# Patient Record
Sex: MALE | Race: WHITE | Employment: FULL TIME | URBAN - METROPOLITAN AREA
[De-identification: names, ages, dates, MRNs, and addresses within clinical notes are randomized per-mention and may not be internally consistent; named-entity substitution may affect disease eponyms.]

---

## 2018-07-05 ENCOUNTER — HOSPITAL ENCOUNTER (OUTPATIENT)
Age: 33
Discharge: HOME | End: 2018-07-05
Attending: FAMILY MEDICINE
Payer: COMMERCIAL

## 2018-07-05 ENCOUNTER — APPOINTMENT (OUTPATIENT)
Dept: GENERAL RADIOLOGY | Age: 33
End: 2018-07-05
Attending: FAMILY MEDICINE
Payer: COMMERCIAL

## 2018-07-05 VITALS
RESPIRATION RATE: 20 BRPM | HEART RATE: 92 BPM | HEIGHT: 66 IN | WEIGHT: 270 LBS | DIASTOLIC BLOOD PRESSURE: 83 MMHG | SYSTOLIC BLOOD PRESSURE: 130 MMHG | BODY MASS INDEX: 43.39 KG/M2 | OXYGEN SATURATION: 99 % | TEMPERATURE: 98 F

## 2018-07-05 DIAGNOSIS — T14.8XXA ABRASION: ICD-10-CM

## 2018-07-05 DIAGNOSIS — S90.122A CONTUSION OF LESSER TOE OF LEFT FOOT WITHOUT DAMAGE TO NAIL, INITIAL ENCOUNTER: Primary | ICD-10-CM

## 2018-07-05 PROCEDURE — 73660 X-RAY EXAM OF TOE(S): CPT | Performed by: FAMILY MEDICINE

## 2018-07-05 PROCEDURE — 99203 OFFICE O/P NEW LOW 30 MIN: CPT

## 2018-07-05 PROCEDURE — 90471 IMMUNIZATION ADMIN: CPT

## 2018-07-05 RX ORDER — ZOLPIDEM TARTRATE 5 MG/1
2.5 TABLET ORAL NIGHTLY PRN
COMMUNITY

## 2018-07-05 RX ORDER — ESCITALOPRAM OXALATE 5 MG/1
5 TABLET ORAL DAILY
COMMUNITY
End: 2019-11-16 | Stop reason: ALTCHOICE

## 2018-07-05 RX ORDER — FINASTERIDE 5 MG/1
1 TABLET, FILM COATED ORAL DAILY
COMMUNITY

## 2018-07-05 RX ORDER — ATORVASTATIN CALCIUM 40 MG/1
40 TABLET, FILM COATED ORAL NIGHTLY
COMMUNITY

## 2018-07-05 NOTE — ED INITIAL ASSESSMENT (HPI)
Pt stubbed left 5th toe last night. Toe was bleeding last night and is no longer bleeding. Pt is diabetic. Increasing pain with ambulation.

## 2018-07-05 NOTE — ED PROVIDER NOTES
Pt seen in [address] [place] [address]      Stated Complaint: Patient presents with:  Musculoskeletal Problem      --------------   RN assessment:   L 5th toe pain/bleeding since injury last night  --------------    CC: L 5th toe pain    H None (Room air)  Physical Exam:   General :    Alert, cooperative, no distress, appears stated age   Head:    Normocephalic, without obvious abnormality, atraumatic   Eyes:    PERRL, conjunctiva/corneas clear, EOM's intact   Ears:    Normal TM's and extern understanding  F/u w/PCP advised    ----------------------------------------------     Clinical Impression:    Contusion of lesser toe of left foot without damage to nail, initial encounter  (primary encounter diagnosis)  Abrasion    Disposition: Home

## 2019-11-16 PROBLEM — H52.203 MYOPIA OF BOTH EYES WITH ASTIGMATISM: Status: ACTIVE | Noted: 2019-11-16

## 2019-11-16 PROBLEM — E11.9 TYPE 2 DIABETES MELLITUS WITHOUT COMPLICATION, WITHOUT LONG-TERM CURRENT USE OF INSULIN (HCC): Status: ACTIVE | Noted: 2019-11-16

## 2019-11-16 PROBLEM — H52.13 MYOPIA OF BOTH EYES WITH ASTIGMATISM: Status: ACTIVE | Noted: 2019-11-16

## 2020-07-24 ENCOUNTER — HOSPITAL ENCOUNTER (OUTPATIENT)
Age: 35
Discharge: HOME | End: 2020-07-24
Payer: COMMERCIAL

## 2020-07-24 VITALS
RESPIRATION RATE: 18 BRPM | SYSTOLIC BLOOD PRESSURE: 128 MMHG | HEART RATE: 92 BPM | DIASTOLIC BLOOD PRESSURE: 88 MMHG | OXYGEN SATURATION: 100 % | TEMPERATURE: 98 F

## 2020-07-24 DIAGNOSIS — S61.210A LACERATION OF RIGHT INDEX FINGER WITHOUT FOREIGN BODY WITHOUT DAMAGE TO NAIL, INITIAL ENCOUNTER: Primary | ICD-10-CM

## 2020-07-24 PROCEDURE — 99214 OFFICE O/P EST MOD 30 MIN: CPT | Performed by: NURSE PRACTITIONER

## 2020-07-24 NOTE — ED PROVIDER NOTES
Patient presents with:  Laceration Abrasion      HPI:     Cora Chaves is a 38year old male presents for a chief complaint of laceration evaluation and repair.   The patient cut the lateral aspect of his right pointer digit on a knife while cutting a ba Relationships      Social connections:        Talks on phone: Not on file        Gets together: Not on file        Attends [employer] service: Not on file        Active member of club or organization: Not on file        Attends meetings of clubs or [place] pointer digit. Brisk cap refill. MDM/Assessment/Plan:   Orders for this encounter:    No orders of the defined types were placed in this encounter.       Labs performed this visit:  No results found for this or any previous visit (from the past 10 amandeep

## 2020-07-24 NOTE — ED NOTES
Pt discharged to care of self. Pt assessed by NP. Pt after care discussed, all questions answered. Pt confirmed understanding.